# Patient Record
Sex: MALE
[De-identification: names, ages, dates, MRNs, and addresses within clinical notes are randomized per-mention and may not be internally consistent; named-entity substitution may affect disease eponyms.]

---

## 2021-05-11 ENCOUNTER — HOSPITAL ENCOUNTER (OUTPATIENT)
Dept: HOSPITAL 53 - M WUC | Age: 31
End: 2021-05-11
Attending: PHYSICIAN ASSISTANT
Payer: OTHER GOVERNMENT

## 2021-05-11 DIAGNOSIS — S60.122A: Primary | ICD-10-CM
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
## 2021-05-11 NOTE — REP
INDICATION:

CONTUSION



COMPARISON:

None.



TECHNIQUE:

AP, lateral, bilateral oblique views left 2nd digit.



FINDINGS:

A small 3 mm bony fragment is identified adjacent to the terminal tuft which is

nonspecific.  This may represent acute fracture fragment or small old

injury/osteophyte.  The remainder of the examination including visualized portion of

the metacarpal bone and phalanges appear normal.  No subcutaneous emphysema or foreign

body.



IMPRESSION:

Small well corticated bony density adjacent to the terminal tuft as described above..

Finding may or may not represent small fracture fragment and clinical correlation is

recommended.  Remainder of the examination is normal.





<Electronically signed by Corey Calabrese > 05/11/21 1035